# Patient Record
Sex: MALE | Race: WHITE | URBAN - METROPOLITAN AREA
[De-identification: names, ages, dates, MRNs, and addresses within clinical notes are randomized per-mention and may not be internally consistent; named-entity substitution may affect disease eponyms.]

---

## 2022-06-15 ENCOUNTER — OFFICE VISIT (OUTPATIENT)
Dept: URBAN - METROPOLITAN AREA CLINIC 6 | Facility: CLINIC | Age: 32
End: 2022-06-15

## 2022-06-15 DIAGNOSIS — H43.392 OTHER VITREOUS OPACITIES, LEFT EYE: ICD-10-CM

## 2022-06-15 DIAGNOSIS — H31.091 CHORIORETINAL SCARS, RIGHT EYE: Primary | ICD-10-CM

## 2022-06-15 DIAGNOSIS — H53.021 REFRACTIVE AMBLYOPIA, RIGHT EYE: ICD-10-CM

## 2022-06-15 PROCEDURE — 92004 COMPRE OPH EXAM NEW PT 1/>: CPT | Performed by: OPTOMETRIST

## 2022-06-15 ASSESSMENT — INTRAOCULAR PRESSURE
OD: 21
OS: 13

## 2022-06-15 NOTE — IMPRESSION/PLAN
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 Plan: Refractive amblyopia right eye due to high refractive error- The pathophysiology of amblyopia was explained. Stressed the importance of full-time spectacle wear. Recommend use of polycarbonate lenses for extra ocular protection, and discussed the importance of ocular protection during any potentially dangerous activity.